# Patient Record
Sex: FEMALE | Race: WHITE | NOT HISPANIC OR LATINO | Employment: FULL TIME | URBAN - METROPOLITAN AREA
[De-identification: names, ages, dates, MRNs, and addresses within clinical notes are randomized per-mention and may not be internally consistent; named-entity substitution may affect disease eponyms.]

---

## 2024-02-11 ENCOUNTER — OFFICE VISIT (OUTPATIENT)
Dept: URGENT CARE | Facility: URGENT CARE | Age: 44
End: 2024-02-11
Payer: COMMERCIAL

## 2024-02-11 VITALS
OXYGEN SATURATION: 98 % | HEART RATE: 83 BPM | DIASTOLIC BLOOD PRESSURE: 74 MMHG | TEMPERATURE: 98.4 F | WEIGHT: 211 LBS | RESPIRATION RATE: 23 BRPM | SYSTOLIC BLOOD PRESSURE: 121 MMHG
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
## 2024-02-11 PROBLEM — K57.30 SEGMENTAL COLITIS ASSOCIATED WITH DIVERTICULOSIS (H): Status: ACTIVE | Noted: 2020-05-29

## 2024-02-11 PROBLEM — F41.9 ANXIETY: Status: ACTIVE | Noted: 2019-08-16

## 2024-02-11 PROBLEM — K50.10 SEGMENTAL COLITIS ASSOCIATED WITH DIVERTICULOSIS (H): Status: ACTIVE | Noted: 2020-05-29

## 2024-02-11 PROBLEM — E04.1 THYROID NODULE: Status: ACTIVE | Noted: 2020-07-06

## 2024-02-11 PROCEDURE — 99203 OFFICE O/P NEW LOW 30 MIN: CPT | Performed by: NURSE PRACTITIONER

## 2024-02-11 RX ORDER — CETIRIZINE HYDROCHLORIDE 10 MG/1
10 TABLET ORAL DAILY
COMMUNITY

## 2024-02-11 RX ORDER — ALPRAZOLAM 0.5 MG
0.5 TABLET ORAL
COMMUNITY
Start: 2023-12-01

## 2024-02-11 RX ORDER — BUSPIRONE HYDROCHLORIDE 10 MG/1
10 TABLET ORAL 2 TIMES DAILY
COMMUNITY
Start: 2023-12-01

## 2024-02-11 RX ORDER — SPIRONOLACTONE 100 MG/1
100 TABLET, FILM COATED ORAL DAILY
COMMUNITY
Start: 2024-02-11

## 2024-02-11 RX ORDER — HYDROXYZINE HYDROCHLORIDE 25 MG/1
25 TABLET, FILM COATED ORAL 2 TIMES DAILY PRN
Qty: 14 TABLET | Refills: 0 | Status: SHIPPED | OUTPATIENT
Start: 2024-02-11 | End: 2024-02-18

## 2024-02-11 RX ORDER — INFLIXIMAB-DYYB 100 MG/10ML
100 INJECTION, POWDER, LYOPHILIZED, FOR SOLUTION INTRAVENOUS
COMMUNITY
Start: 2022-09-23

## 2024-02-11 RX ORDER — PREDNISONE 20 MG/1
TABLET ORAL
Qty: 20 TABLET | Refills: 0 | Status: SHIPPED | OUTPATIENT
Start: 2024-02-11

## 2024-02-11 RX ORDER — BUPROPION HYDROCHLORIDE 300 MG/1
300 TABLET ORAL EVERY MORNING
COMMUNITY
Start: 2023-12-01

## 2024-02-11 RX ORDER — LACTOBACILLUS RHAMNOSUS GG 10B CELL
1 CAPSULE ORAL DAILY
COMMUNITY

## 2024-02-11 RX ORDER — CYANOCOBALAMIN 1000 UG/ML
1 INJECTION, SOLUTION INTRAMUSCULAR; SUBCUTANEOUS
COMMUNITY
Start: 2023-12-14

## 2024-02-11 RX ORDER — PERMETHRIN 50 MG/G
CREAM TOPICAL
Qty: 60 G | Refills: 1 | Status: SHIPPED | OUTPATIENT
Start: 2024-02-11

## 2024-02-11 NOTE — PROGRESS NOTES
Assessment & Plan      Diagnosis Comments   1. Flea bite of multiple sites  permethrin (ELIMITE) 5 % external cream, predniSONE (DELTASONE) 20 MG tablet, hydrOXYzine HCl (ATARAX) 25 MG tablet recommend treatment with oral prednisone to help with spread and inflammatory reaction response.  Patient may take Atarax at night would recommend continuing with Zyrtec during the day.  Will also try permethrin to cover any mites that may have come in contact with her she may repeat this in 1 week.  Will follow-up with her primary care if symptoms or not improving in 1 to 2 weeks.  Patient verbalized understanding was in agreement with plan we discussed red flags that would warrant emergent or urgent evaluation.            ALEJANDRO Wilburn [facility]    [place]     Mariaelena is a 44 year old female who presents to clinic today for the following health issues:  Chief Complaint   Patient presents with    Urgent Care     Present for itchy bug bites over various parts of body since Thursday.       HPI    Patient recently returned from a trip to [place] approximately 2 days after returning she started to notice multiple papular areas scattered on arms and legs somewhat on trunk a couple spots on left cheek that are pruritic.  States no one else in the family or  have similar rashes.  She has been using triamcinolone, hydrocortisone topically as well as taking her normal dose of Zyrtec daily 10 mg.  States symptoms have been worsening with spreading of rash.  She states ice and cool showers help with the symptoms heat worsens her symptoms.  Patient has allergy to Benadryl with increased heart rate.  Denies any new product use.  States she was in the sand quite a bit while she was on vacation.      Review of Systems  Constitutional, HEENT, cardiovascular, pulmonary, gi and gu systems are negative, except as otherwise noted.      Objective    /74   Pulse 83   Temp 98.4  F  (36.9  C) (Tympanic)   Resp 23   Wt 95.7 kg (211 lb)   SpO2 98%   Physical Exam   GENERAL: alert and no distress  EYES: Eyes grossly normal to inspection, PERRL and conjunctivae and sclerae normal  HENT: ear canals and TM's normal, nose and mouth without ulcers or lesions  NECK: no adenopathy, no asymmetry, masses, or scars  RESP: lungs clear to auscultation - no rales, rhonchi or wheezes  CV: regular rate and rhythm, normal S1 S2, no S3 or S4, no murmur, click or rub, no peripheral edema  ABDOMEN: soft, nontender, no hepatosplenomegaly, no masses and bowel sounds normal  MS: no gross musculoskeletal defects noted, no edema  SKIN: Papular rash covering upper and lower extremities scattered some on trunk there are 2 similar spots left cheek ranging in size from 2 to 3 mm negative for drainage or fluctuance mild warmth and lichenification noted no areas of swelling mild erythema.

## 2024-06-16 ENCOUNTER — HEALTH MAINTENANCE LETTER (OUTPATIENT)
Age: 44
End: 2024-06-16

## 2024-11-08 ENCOUNTER — TRANSFERRED RECORDS (OUTPATIENT)
Dept: HEALTH INFORMATION MANAGEMENT | Facility: CLINIC | Age: 44
End: 2024-11-08

## 2025-03-11 ENCOUNTER — MEDICAL CORRESPONDENCE (OUTPATIENT)
Dept: HEALTH INFORMATION MANAGEMENT | Facility: CLINIC | Age: 45
End: 2025-03-11
Payer: COMMERCIAL

## 2025-03-11 ENCOUNTER — TRANSCRIBE ORDERS (OUTPATIENT)
Dept: OTHER | Age: 45
End: 2025-03-11

## 2025-03-11 DIAGNOSIS — J34.89 NASAL SORE: Primary | ICD-10-CM

## 2025-03-11 NOTE — TELEPHONE ENCOUNTER
REFERRAL INFORMATION:  Referring By: Mario Hobbs   Referring Clinic: [facility]   Reason for Visit/Diagnosis: Evaluate and treat for non-healing nasal lesion.  REF BY Provider: Mario Hobbs   [facility] Affiliated with: [facility]   SCHED W/PT  CONF LOC      FUTURE VISIT INFORMATION:  Appointment Date: 4/24/25  Appointment Time: 9:10AM      NOTES STATUS DETAILS   OFFICE NOTE from referring provider IN PROGRESS   Mario Hobbs @ [facility]    OFFICE NOTE from other specialist      HOSPITAL DISCHARGE SUMMARY     PROCEDURE / OPERATIVE REPORTS      EAR     AUDIOLOGY NOTES     AUDIOGRAM, TYMPANOGRAM, VESTIBULAR/ BALANCE TEST   *graph / tracing raw data*     THROAT / VOICE     GI EVALUATION     LARYNGOSCOPY, VOCAL CORD SURGERY, MICROLARYNGOSCOPY, BRONCHOSCOPY, etc ...     BRAVO pH STUDY/ ESOPHAGEAL MOTILITY TEST   *graph/ tracing raw data*     PULMONARY FUNCTION TESTS (PFTs)     FACIAL PLASTIC RECONSTRUCTION     SPEECH & LANGUAGE PATHOLOGY (Note)     LABS      EAR CULTURE / BETA-2-TRANSFERRIN     PATHOLOGY REPORTS      PATHOLOGY SLIDES TRACKING   Tracking #:   IMAGING *pertaining images & report*       CT, MRI, PET, NM, US, XRAYS      IMAGE DISC TRACKING   Tracking #:

## 2025-03-20 NOTE — TELEPHONE ENCOUNTER
REFERRAL INFORMATION:  Referring By: Mario Hobbs   Referring Clinic: [facility]  Reason for Visit/Diagnosis: Evaluate and treat for non-healing nasal lesion.  REF BY Provider: Mario Hobbs   [facility] Affiliated with: [facility]   SCHED W/PT  CONF LOC    FUTURE VISIT INFORMATION:  Appointment Date: 5/14/25  Appointment Time: 3:20PM    NOTES STATUS DETAILS   OFFICE NOTE from referring provider Scanned in   3/11/25, 11/8/24- Referral and OV wMario Medrano @ [facility]

## 2025-04-24 ENCOUNTER — PRE VISIT (OUTPATIENT)
Dept: OTOLARYNGOLOGY | Facility: CLINIC | Age: 45
End: 2025-04-24

## 2025-05-14 ENCOUNTER — PRE VISIT (OUTPATIENT)
Dept: OTOLARYNGOLOGY | Facility: CLINIC | Age: 45
End: 2025-05-14

## 2025-05-14 ENCOUNTER — OFFICE VISIT (OUTPATIENT)
Dept: OTOLARYNGOLOGY | Facility: CLINIC | Age: 45
End: 2025-05-14
Attending: OTOLARYNGOLOGY
Payer: COMMERCIAL

## 2025-05-14 VITALS
BODY MASS INDEX: 33.04 KG/M2 | OXYGEN SATURATION: 98 % | DIASTOLIC BLOOD PRESSURE: 75 MMHG | WEIGHT: 168.3 LBS | HEART RATE: 98 BPM | SYSTOLIC BLOOD PRESSURE: 108 MMHG | HEIGHT: 60 IN

## 2025-05-14 DIAGNOSIS — J34.89 NASAL SORE: ICD-10-CM

## 2025-05-14 PROCEDURE — 99203 OFFICE O/P NEW LOW 30 MIN: CPT | Mod: 25 | Performed by: STUDENT IN AN ORGANIZED HEALTH CARE EDUCATION/TRAINING PROGRAM

## 2025-05-14 PROCEDURE — 31231 NASAL ENDOSCOPY DX: CPT | Performed by: STUDENT IN AN ORGANIZED HEALTH CARE EDUCATION/TRAINING PROGRAM

## 2025-05-14 PROCEDURE — 1126F AMNT PAIN NOTED NONE PRSNT: CPT | Performed by: STUDENT IN AN ORGANIZED HEALTH CARE EDUCATION/TRAINING PROGRAM

## 2025-05-14 PROCEDURE — 3078F DIAST BP <80 MM HG: CPT | Performed by: STUDENT IN AN ORGANIZED HEALTH CARE EDUCATION/TRAINING PROGRAM

## 2025-05-14 PROCEDURE — 3074F SYST BP LT 130 MM HG: CPT | Performed by: STUDENT IN AN ORGANIZED HEALTH CARE EDUCATION/TRAINING PROGRAM

## 2025-05-14 RX ORDER — NORTRIPTYLINE HYDROCHLORIDE 25 MG/1
25 CAPSULE ORAL
COMMUNITY
Start: 2025-04-18

## 2025-05-14 RX ORDER — TIRZEPATIDE 10 MG/.5ML
INJECTION, SOLUTION SUBCUTANEOUS
COMMUNITY
Start: 2025-04-04

## 2025-05-14 RX ORDER — CEPHALEXIN 500 MG/1
CAPSULE ORAL
COMMUNITY
Start: 2024-12-05

## 2025-05-14 RX ORDER — PHENTERMINE HYDROCHLORIDE 15 MG/1
CAPSULE ORAL
COMMUNITY
Start: 2024-09-12

## 2025-05-14 RX ORDER — METHOCARBAMOL 750 MG/1
TABLET, FILM COATED ORAL
COMMUNITY
Start: 2024-09-17

## 2025-05-14 RX ORDER — METRONIDAZOLE 500 MG/1
1 TABLET ORAL
COMMUNITY
Start: 2025-03-26

## 2025-05-14 RX ORDER — MUPIROCIN 20 MG/G
OINTMENT TOPICAL
COMMUNITY
Start: 2024-08-27

## 2025-05-14 ASSESSMENT — PAIN SCALES - GENERAL: PAINLEVEL_OUTOF10: NO PAIN (0)

## 2025-05-14 NOTE — PATIENT INSTRUCTIONS
You were seen in the ENT Clinic today by Dr. Pathak. If you have any questions or concerns after your appointment, please contact us (see below)       2.   The following recommendations have been made based upon your appointment today:  Apply Aquaphor or Ayr nasal gel to the inside of the right nostril as needed.      3.   Plan to return to the ENT clinic as needed.           How to Contact Us:  Send a "[employer]" message to your provider. Our team will respond to you via "[employer]". Occasionally, we will need to call you to get further information.  For urgent matters (Monday-Friday), call the ENT Clinic: [phone] and speak with a call center team member - they will route your call appropriately.   If you'd like to speak directly with a nurse, please find our contact information below. We do our best to check voicemail frequently throughout the day, and will work to call you back within 1-2 days. For urgent matters, please use the general clinic phone numbers listed above.     Paula GALICIA RN  Direct: [phone]  Jamee PEREZ LPN  Direct: [phone]         [facility]  Department of Otolaryngology

## 2025-05-14 NOTE — PROGRESS NOTES
[facility]  New Patient Visit      Encounter date:   May 14, 2025    Referring Provider:   Mario Hobbs MD  [facility] [place]  [address] [place] [address]    Chief Complaint: Consult     History of Present Illness:   Mariaelena Mcdaniels is a 45 year old female who presents for consultation regarding non-healing nasal lesion.     5/14/25: Today, the patient reports that she has had this lesion for a long time. It has come and gone before. This last time, the lesion has been present for a year. She states that it does improve with the Mupirocin topical cream but returns in the winter. She states that she has good days and bad days. She reports blowing her nose a lot, she will pick her nose if there is a scab present. She has no history of any nasal surgeries. She reports occasional nasal bleeding on the right side. Previously there has been some thought that she may have GPA but no active concerns of that. Feels she's in a good spot currently after using Mupirocin consistently for multiple weeks.       Review of systems: A 14-point review of systems has been conducted and was negative for any notable symptoms, except as dictated in the history of present illness.     Medical History:  Past Medical History:   Diagnosis Date    NO ACTIVE PROBLEMS(aka NONE) 1/18/2005        Surgical History:   Past Surgical History:   Procedure Laterality Date    HC TOOTH EXTRACTION W/FORCEP          Family History:  Family History   Problem Relation Age of Onset    C.A.D. No family hx of     Diabetes Maternal Grandfather     Breast Cancer No family hx of     Cancer - colorectal No family hx of         Social History:   Social History     Socioeconomic History    Marital status: Single    Number of children: 0    Years of education: 16   Occupational History    Occupation:      Employer: [email or web address]   Tobacco Use    Smoking status: Former     Current packs/day: 0.25      Average packs/day: 0.3 packs/day for 6.0 years (1.5 ttl pk-yrs)     Types: Cigarettes    Smokeless tobacco: Never   Substance and Sexual Activity    Alcohol use: Yes     Comment: Occasionaly    Drug use: No    Sexual activity: Never   Other Topics Concern    Exercise Yes    Seat Belt Yes    Self-Exams No   Social History Narrative    History of abusive relationships on review today    no    Feels safe in home and work envirmt.                    yes -                  Social Drivers of Health     Financial Resource Strain: Low Risk  (3/19/2024)    Received from [facility]    Financial Resource Strain     Difficulty of Paying Living Expenses: 3   Food Insecurity: No Food Insecurity (3/19/2024)    Received from [facility] [facility]    Food Insecurity     Do you worry your food will run out before you are able to buy more?: 1   Transportation Needs: No Transportation Needs (3/19/2024)    [facility]    Transportation Needs     Does lack of transportation keep you from medical appointments?: 1     Does lack of transportation keep you from work, meetings or getting things that you need?: 1   Social Connections: Socially Integrated (3/19/2024)    Received from [facility]    Social Connections     Do you often feel lonely or isolated from those around you?: 0   Housing Stability: Low Risk  (3/19/2024)    Received from [facility] [facility]    Housing Stability     What is your housing situation today?: 1        Physical Exam:  Vital signs: There were no vitals taken for this visit.   General Appearance: No acute distress, appropriate demeanor, conversant  Eyes: moist conjunctivae; EOMI; pupils symmetric; visual acuity grossly intact; no proptosis  Head: normocephalic; overall symmetric appearance without deformity  Face: overall symmetric without deformity  Ears:  Normal appearance of external ear; external meatus normal in appearance  Nose: No external deformity  Oral Cavity/oropharynx: Normal appearance of mucosa  Neck: no palpable lymphadenopathy; thyroid without palpable nodules  Lungs: symmetric chest rise; no wheezing  CV: Good distal perfusion; normal hear rate  Extremities: No deformity  Neurologic Exam: Cranial nerves II-XII are grossly intact; no focal deficit    Procedure Note  Procedure performed: Rigid nasal endoscopy  Indication: To evaluate for sinonasal pathology not visualized on routine anterior rhinoscopy  Anesthesia: 4% topical lidocaine with 0.05% oxymetazoline  Description of procedure: After obtaining consent for the procedure from the patient, the sinonasal cavity was sprayed with topical anesthetic. A rigid 30-degree nasal endoscope was used to first used to visualize the nasal cavity, the turbinates, and the nasopharynx on the left. A second pass was then made to visualize the middle meatus, the superior meatus and sphenoethmoid recess. Finally, the scope was turned 90-degrees and used to visualize the olfactory cleft and frontal outflow tract. A similar approach was used for the contralateral side. They tolerated the procedure well without difficulty.    Montgomery-Bernardino Endoscopic Scoring System  Endoscopic observation Right Left   Polyps in middle meatus (0 = absent, 1 = restricted to middle meatus, 2 = Beyond middle meatus) 0 0   Discharge (0 = absent, 1 = thin and clear, 2 = thick, purulent) 0 0   Edema (0 = absent, 1 = mild-moderate, 2 = moderate-severe) 0 0   Crusting (0 = absent, 1 = mild-moderate, 2 = moderate-severe) 0 0   Scarring (0= absent, 1 = mild-moderate, 2 = moderate-severe) 1 0   Total 1 0     Findings  RT: No evidence of active ulceration. Evidence of scar tissue on the anterior septum.   LT: Unremarkable sinonasal exam. No evidence of ulceration on the septum.     The patient tolerated the procedure well without complication.      Assessment/Medical Decision Making:  Mariaelena Mcdaniels is a 45 year old female who presents for consultation regarding non-healing nasal lesion. Upon exam, I see that there is scarring on the nasal mucosa in the front of her septum on the right side. We discussed the importance of increasing moisture in the nose to prevent the scarring from causing a lesion. Expect that with time the inflammation/ulceration has caused a scar band that increases her risk of ulceration in the winter. Discussed that with appropriate use of aquaphor or ayr nasal gel I think we can prevent this from worsening. If it did significantly worsen could consider surgery with silastic sheet placement.     Plan:  Discussed the importance of increasing moisture in the nose. She can use Ayr nasal gel or Aquaphor in the nose twice daily to help with this.   Follow up with me as needed.       Scribe Disclosure:   By signing my name below, I, Grisel Masters, attest that this documentation has been prepared under the direction and in the presence of Rolly Pathak MD.  - Electronically Signed: Grisel Masters 05/14/25     Provider Disclosure:  I agree with above History, Review of Systems, Physical exam and Plan.  I have reviewed the content of the documentation and have edited it as needed. I have personally performed the services documented here and the documentation accurately represents those services and the decisions I have made.      Electronically signed by:    Rolly Pathak MD    [facility]  Rhinology  Endoscopic Skull Base Surgery  [facility]  Department of Otolaryngology - Head & Neck Surgery

## 2025-05-14 NOTE — LETTER
5/14/2025       RE: Mariaelena Mcdaniels  [address] [place] [address]     Dear Colleague,    Thank you for referring your patient, Mariaelena Mcdaniels, to the [facility] [place] at [facility]. Please see a copy of my visit note below.      [facility]  New Patient Visit      Encounter date:   May 14, 2025    Referring Provider:   Mario Hobbs MD  [facility] [place] [address]    Chief Complaint: Consult     History of Present Illness:   Mariaelena Mcdaniels is a 45 year old female who presents for consultation regarding non-healing nasal lesion.     5/14/25: Today, the patient reports that she has had this lesion for a long time. It has come and gone before. This last time, the lesion has been present for a year. She states that it does improve with the Mupirocin topical cream but returns in the winter. She states that she has good days and bad days. She reports blowing her nose a lot, she will pick her nose if there is a scab present. She has no history of any nasal surgeries. She reports occasional nasal bleeding on the right side. Previously there has been some thought that she may have GPA but no active concerns of that. Feels she's in a good spot currently after using Mupirocin consistently for multiple weeks.       Review of systems: A 14-point review of systems has been conducted and was negative for any notable symptoms, except as dictated in the history of present illness.     Medical History:  Past Medical History:   Diagnosis Date     NO ACTIVE PROBLEMS(aka NONE) 1/18/2005        Surgical History:   Past Surgical History:   Procedure Laterality Date     HC TOOTH EXTRACTION W/FORCEP          Family History:  Family History   Problem Relation Age of Onset     C.A.D. No family hx of      Diabetes Maternal Grandfather      Breast Cancer No family hx of       Cancer - colorectal No family hx of         Social History:   Social History     Socioeconomic History     Marital status: Single     Number of children: 0     Years of education: 16   Occupational History     Occupation:      Employer: [employer]   Tobacco Use     Smoking status: Former     Current packs/day: 0.25     Average packs/day: 0.3 packs/day for 6.0 years (1.5 ttl pk-yrs)     Types: Cigarettes     Smokeless tobacco: Never   Substance and Sexual Activity     Alcohol use: Yes     Comment: Occasionaly     Drug use: No     Sexual activity: Never   Other Topics Concern     Exercise Yes     Seat Belt Yes     Self-Exams No   Social History Narrative    History of abusive relationships on review today    no    Feels safe in home and work envirmt.                    yes -                  Social Drivers of Health     Financial Resource Strain: Low Risk  (3/19/2024)    Received from [facility]    Financial Resource Strain      Difficulty of Paying Living Expenses: 3   Food Insecurity: No Food Insecurity (3/19/2024)    [facility]    Food Insecurity      Do you worry your food will run out before you are able to buy more?: 1   Transportation Needs: No Transportation Needs (3/19/2024)    Received from [facility]    Transportation Needs      Does lack of transportation keep you from medical appointments?: 1      Does lack of transportation keep you from work, meetings or getting things that you need?: 1   Social Connections: Socially Integrated (3/19/2024)    Received from [facility]    Social Connections      Do you often feel lonely or isolated from those around you?: 0   Housing Stability: Low Risk  (3/19/2024)    Received from [facility]    Housing Stability      What is your housing situation today?: 1         Physical Exam:  Vital signs: There were no vitals taken for this visit.   General Appearance: No acute distress, appropriate demeanor, conversant  Eyes: moist conjunctivae; EOMI; pupils symmetric; visual acuity grossly intact; no proptosis  Head: normocephalic; overall symmetric appearance without deformity  Face: overall symmetric without deformity  Ears: Normal appearance of external ear; external meatus normal in appearance  Nose: No external deformity  Oral Cavity/oropharynx: Normal appearance of mucosa  Neck: no palpable lymphadenopathy; thyroid without palpable nodules  Lungs: symmetric chest rise; no wheezing  CV: Good distal perfusion; normal hear rate  Extremities: No deformity  Neurologic Exam: Cranial nerves II-XII are grossly intact; no focal deficit    Procedure Note  Procedure performed: Rigid nasal endoscopy  Indication: To evaluate for sinonasal pathology not visualized on routine anterior rhinoscopy  Anesthesia: 4% topical lidocaine with 0.05% oxymetazoline  Description of procedure: After obtaining consent for the procedure from the patient, the sinonasal cavity was sprayed with topical anesthetic. A rigid 30-degree nasal endoscope was used to first used to visualize the nasal cavity, the turbinates, and the nasopharynx on the left. A second pass was then made to visualize the middle meatus, the superior meatus and sphenoethmoid recess. Finally, the scope was turned 90-degrees and used to visualize the olfactory cleft and frontal outflow tract. A similar approach was used for the contralateral side. They tolerated the procedure well without difficulty.    [place]-Bernardino Endoscopic Scoring System  Endoscopic observation Right Left   Polyps in middle meatus (0 = absent, 1 = restricted to middle meatus, 2 = Beyond middle meatus) 0 0   Discharge (0 = absent, 1 = thin and clear, 2 = thick, purulent) 0 0   Edema (0 = absent, 1 = mild-moderate, 2 = moderate-severe) 0 0   Crusting (0 = absent, 1 =  mild-moderate, 2 = moderate-severe) 0 0   Scarring (0= absent, 1 = mild-moderate, 2 = moderate-severe) 1 0   Total 1 0     Findings  RT: No evidence of active ulceration. Evidence of scar tissue on the anterior septum.   LT: Unremarkable sinonasal exam. No evidence of ulceration on the septum.     The patient tolerated the procedure well without complication.     Assessment/Medical Decision Making:  Mariaelena Mcdaniels is a 45 year old female who presents for consultation regarding non-healing nasal lesion. Upon exam, I see that there is scarring on the nasal mucosa in the front of her septum on the right side. We discussed the importance of increasing moisture in the nose to prevent the scarring from causing a lesion. Expect that with time the inflammation/ulceration has caused a scar band that increases her risk of ulceration in the winter. Discussed that with appropriate use of aquaphor or ayr nasal gel I think we can prevent this from worsening. If it did significantly worsen could consider surgery with silastic sheet placement.     Plan:  Discussed the importance of increasing moisture in the nose. She can use Ayr nasal gel or Aquaphor in the nose twice daily to help with this.   Follow up with me as needed.       Scribe Disclosure:   By signing my name below, I, Grisel Masters, attest that this documentation has been prepared under the direction and in the presence of Rolly Pathak MD.  - Electronically Signed: Grisel Masters 05/14/25     Provider Disclosure:  I agree with above History, Review of Systems, Physical exam and Plan.  I have reviewed the content of the documentation and have edited it as needed. I have personally performed the services documented here and the documentation accurately represents those services and the decisions I have made.      Electronically signed by:    Rolly Pathak MD    [facility]  Rhinology  Endoscopic Skull Base Surgery  [facility]  [place]  Department of Otolaryngology - Head & Neck Surgery          Again, thank you for allowing me to participate in the care of your patient.      Sincerely,    Rolly Pathak MD

## 2025-05-14 NOTE — NURSING NOTE
Chief Complaint   Patient presents with    Consult   Blood pressure 108/75, pulse 98, height 1.524 m (5'), weight 76.3 kg (168 lb 4.8 oz), SpO2 98%. Phan Rock, EMT

## 2025-06-21 ENCOUNTER — HEALTH MAINTENANCE LETTER (OUTPATIENT)
Age: 45
End: 2025-06-21